# Patient Record
Sex: MALE | Employment: UNEMPLOYED | URBAN - METROPOLITAN AREA
[De-identification: names, ages, dates, MRNs, and addresses within clinical notes are randomized per-mention and may not be internally consistent; named-entity substitution may affect disease eponyms.]

---

## 2023-11-28 ENCOUNTER — HOSPITAL ENCOUNTER (OUTPATIENT)
Dept: RADIOLOGY | Facility: EXTERNAL LOCATION | Age: 28
Discharge: HOME | End: 2023-11-28

## 2023-11-28 DIAGNOSIS — M79.645 FINGER PAIN, LEFT: ICD-10-CM

## 2023-11-28 DIAGNOSIS — M25.511 RIGHT SHOULDER PAIN, UNSPECIFIED CHRONICITY: ICD-10-CM

## 2024-03-01 ENCOUNTER — HOSPITAL ENCOUNTER (OUTPATIENT)
Dept: RADIOLOGY | Facility: EXTERNAL LOCATION | Age: 29
Discharge: HOME | End: 2024-03-01

## 2024-03-01 DIAGNOSIS — M79.645 FINGER PAIN, LEFT: ICD-10-CM

## 2024-03-06 ENCOUNTER — OFFICE VISIT (OUTPATIENT)
Dept: ORTHOPEDIC SURGERY | Facility: CLINIC | Age: 29
End: 2024-03-06

## 2024-03-06 VITALS — WEIGHT: 165 LBS | BODY MASS INDEX: 25.9 KG/M2 | HEIGHT: 67 IN

## 2024-03-06 DIAGNOSIS — M79.645 FINGER PAIN, LEFT: ICD-10-CM

## 2024-03-06 DIAGNOSIS — S62.611A DISPLACED FRACTURE OF PROXIMAL PHALANX OF LEFT INDEX FINGER, INITIAL ENCOUNTER FOR CLOSED FRACTURE: ICD-10-CM

## 2024-03-06 DIAGNOSIS — S62.611A DISPLACED FRACTURE OF PROXIMAL PHALANX OF LEFT INDEX FINGER, INITIAL ENCOUNTER FOR CLOSED FRACTURE: Primary | ICD-10-CM

## 2024-03-06 PROCEDURE — 99204 OFFICE O/P NEW MOD 45 MIN: CPT | Performed by: ORTHOPAEDIC SURGERY

## 2024-03-06 ASSESSMENT — PAIN - FUNCTIONAL ASSESSMENT: PAIN_FUNCTIONAL_ASSESSMENT: 0-10

## 2024-03-06 ASSESSMENT — PAIN SCALES - GENERAL: PAINLEVEL_OUTOF10: 8
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
## 2024-03-07 ENCOUNTER — APPOINTMENT (OUTPATIENT)
Dept: ORTHOPEDIC SURGERY | Facility: CLINIC | Age: 29
End: 2024-03-07

## 2024-03-11 ENCOUNTER — APPOINTMENT (OUTPATIENT)
Dept: OCCUPATIONAL THERAPY | Facility: HOSPITAL | Age: 29
End: 2024-03-11

## 2024-03-11 ENCOUNTER — APPOINTMENT (OUTPATIENT)
Dept: ORTHOPEDIC SURGERY | Facility: CLINIC | Age: 29
End: 2024-03-11

## 2024-03-12 ENCOUNTER — EVALUATION (OUTPATIENT)
Dept: OCCUPATIONAL THERAPY | Facility: HOSPITAL | Age: 29
End: 2024-03-12

## 2024-03-12 DIAGNOSIS — M25.642 FINGER STIFFNESS, LEFT: ICD-10-CM

## 2024-03-12 DIAGNOSIS — S62.611D CLOSED DISPLACED FRACTURE OF PROXIMAL PHALANX OF LEFT INDEX FINGER WITH ROUTINE HEALING, SUBSEQUENT ENCOUNTER: Primary | ICD-10-CM

## 2024-03-12 DIAGNOSIS — S62.611A DISPLACED FRACTURE OF PROXIMAL PHALANX OF LEFT INDEX FINGER, INITIAL ENCOUNTER FOR CLOSED FRACTURE: ICD-10-CM

## 2024-03-12 DIAGNOSIS — M79.645 FINGER PAIN, LEFT: ICD-10-CM

## 2024-03-12 PROCEDURE — 97165 OT EVAL LOW COMPLEX 30 MIN: CPT | Mod: GO

## 2024-03-12 ASSESSMENT — PAIN SCALES - GENERAL: PAINLEVEL_OUTOF10: 7

## 2024-03-12 ASSESSMENT — ENCOUNTER SYMPTOMS
LOSS OF SENSATION IN FEET: 0
DEPRESSION: 0
OCCASIONAL FEELINGS OF UNSTEADINESS: 0

## 2024-03-12 ASSESSMENT — PAIN - FUNCTIONAL ASSESSMENT: PAIN_FUNCTIONAL_ASSESSMENT: 0-10

## 2024-03-12 ASSESSMENT — PAIN DESCRIPTION - DESCRIPTORS: DESCRIPTORS: ACHING
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
## 2024-04-12 DIAGNOSIS — S62.611A DISPLACED FRACTURE OF PROXIMAL PHALANX OF LEFT INDEX FINGER, INITIAL ENCOUNTER FOR CLOSED FRACTURE: ICD-10-CM

## 2024-04-15 ENCOUNTER — APPOINTMENT (OUTPATIENT)
Dept: RADIOLOGY | Facility: HOSPITAL | Age: 29
End: 2024-04-15

## 2024-04-15 ENCOUNTER — APPOINTMENT (OUTPATIENT)
Dept: ORTHOPEDIC SURGERY | Facility: CLINIC | Age: 29
End: 2024-04-15

## 2024-04-17 ENCOUNTER — OFFICE VISIT (OUTPATIENT)
Dept: ORTHOPEDIC SURGERY | Facility: CLINIC | Age: 29
End: 2024-04-17

## 2024-04-17 VITALS — HEIGHT: 67 IN | BODY MASS INDEX: 25.9 KG/M2 | WEIGHT: 165 LBS

## 2024-04-17 DIAGNOSIS — S62.611A DISPLACED FRACTURE OF PROXIMAL PHALANX OF LEFT INDEX FINGER, INITIAL ENCOUNTER FOR CLOSED FRACTURE: Primary | ICD-10-CM

## 2024-04-17 PROCEDURE — 99214 OFFICE O/P EST MOD 30 MIN: CPT | Performed by: ORTHOPAEDIC SURGERY

## 2024-04-17 ASSESSMENT — PAIN - FUNCTIONAL ASSESSMENT: PAIN_FUNCTIONAL_ASSESSMENT: 0-10

## 2024-04-17 ASSESSMENT — PAIN SCALES - GENERAL: PAINLEVEL_OUTOF10: 5 - MODERATE PAIN
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
## 2024-07-05 ENCOUNTER — DOCUMENTATION (OUTPATIENT)
Dept: OCCUPATIONAL THERAPY | Facility: HOSPITAL | Age: 29
End: 2024-07-05

## 2024-07-05 NOTE — PROGRESS NOTES
Occupational Therapy    Discharge Summary    Name: Bebe Green  MRN: [id number]  : 1995  Date: 24    Discharge Summary: OT    Discharge Information: Date of discharge 24, Date of last visit 3/12/24, Date of evaluation 3/12/24, Number of attended visits 1, Referred by Dr. Dickey, and Referred for LIF fx    Therapy Summary: Skilled OT services addressed ROM, strength, and HEP to increase independence with ADL and IADL tasks.    Discharge Status: Pt did not schedule any follow-up OT sessions.      Rehab Discharge Reason: Failed to schedule and/or keep follow-up appointment(s)